# Patient Record
(demographics unavailable — no encounter records)

---

## 2024-10-30 NOTE — HISTORY OF PRESENT ILLNESS
[FreeTextEntry1] : Patient is a 52 yo F with DM2 dx 15, longstanding HTN since teenager, anemia, CKD with nephrotic range proteinura, who presents for followup of CKD.  Reviewed labs in detail. sCr stable but proteinuria and A1C a bit worse, has been having a lot of death in the family and depressed so not walking as much or eating as well.   Meds: Janumet, losartan, amlodpine, kerendia, Jardiance, iron  Taking iron with vitamin C every other day   PCP Dr. 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 Paternal aunt on dialysis, also with DM.  All other ROS negative

## 2024-10-30 NOTE — ASSESSMENT
[FreeTextEntry1] : Patient is a 52 yo F with DM2 dx 15, longstanding HTN since teenager, anemia, CKD with nephrotic range proteinura, who presents for followup of CKD.  CKD with proteinuria - likely 2/2 HTN and DM, ?autoimmune disease - Ophtho without diabetes per patient - 24 hour ABPM white coat HTN and well controlled HTN, no masked HTN  HTN - kerendia, losartan Discussed how to take BP appropriately at home. Advised not to smoke, drink caffeinated beverages or exercise within 30 minutes before measuring BP. Make sure cuff fits arm, as small cuffs can artificially raise BP. Make sure bladder is empty. With the cuff on your bare arm, sit in an upright position with back supported, feet flat on the floor and arm supported at heart level. Make sure the bottom of the cuff is directly above the bend of the elbow. Relax for about five minutes before taking a measurement. Resist the urge to talk or look at a cellphone. Wait one minute and retake BP, consider 3rd if elevated. Average the three readings and record in log book, bring to each check up. BP Cuff checked 4/5/2024, accurate  Family h/o kidney disease - [facility] genetic results previously reviewed with patient, 6 varients of undetermined significance found that can be associated with glomerulopathies however not usually associated with disease if heterozygous which she was for all. Advised to seek genetic counselling through [facility] if needed for more information. APOL1 gene testing NEGATIVE.  RTC in 4 months with labs prior.

## 2024-10-30 NOTE — HISTORY OF PRESENT ILLNESS
[FreeTextEntry1] : Patient is a 54 yo F with DM2 dx 15, longstanding HTN since teenager, anemia, CKD with nephrotic range proteinura, who presents for followup of CKD.  Reviewed labs in detail. sCr stable but proteinuria and A1C a bit worse, has been having a lot of death in the family and depressed so not walking as much or eating as well.   Meds: Janumet, losartan, amlodpine, kerendia, Jardiance, iron  Taking iron with vitamin C every other day   PCP Dr. 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 Paternal aunt on dialysis, also with DM.  All other ROS negative

## 2025-05-19 NOTE — HISTORY OF PRESENT ILLNESS
[FreeTextEntry1] : Patient is a 53 yo F with DM2 dx 15, longstanding HTN since teenager, anemia, CKD with nephrotic range proteinura, who presents for followup of CKD.  Reviewed labs in detail. sCr stable but proteinuria and A1C a bit worse again, up worse since october Will send note to edgardo Meds: Janumet, losartan, amlodpine, kerendia, Jardiance, iron  Taking iron with vitamin C every other day   PCP Dr. 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 Paternal aunt on dialysis, also with DM.  All other ROS negative

## 2025-05-19 NOTE — ASSESSMENT
[FreeTextEntry1] : Patient is a 55 yo F with DM2 dx 15, longstanding HTN since teenager, anemia, CKD with nephrotic range proteinura, who presents for followup of CKD.  CKD with proteinuria - likely 2/2 HTN and DM, ?autoimmune disease - Ophtho without diabetes per patient - 24 hour ABPM white coat HTN and well controlled HTN, no masked HTN - A1C and protienuria worsenign together, likely 2/2 DM, encouraged to reach out to pcp and endo for management  HTN - kerendia, losartan Discussed how to take BP appropriately at home. Advised not to smoke, drink caffeinated beverages or exercise within 30 minutes before measuring BP. Make sure cuff fits arm, as small cuffs can artificially raise BP. Make sure bladder is empty. With the cuff on your bare arm, sit in an upright position with back supported, feet flat on the floor and arm supported at heart level. Make sure the bottom of the cuff is directly above the bend of the elbow. Relax for about five minutes before taking a measurement. Resist the urge to talk or look at a cellphone. Wait one minute and retake BP, consider 3rd if elevated. Average the three readings and record in log book, bring to each check up. BP Cuff checked 4/5/2024, accurate  Family h/o kidney disease - [facility] genetic results previously reviewed with patient, 6 varients of undetermined significance found that can be associated with glomerulopathies however not usually associated with disease if heterozygous which she was for all. Advised to seek genetic counselling through [facility] if needed for more information. APOL1 gene testing NEGATIVE.  RTC in 4 months with labs prior.

## 2025-05-19 NOTE — PHYSICAL EXAM
[General Appearance - Alert] : alert [General Appearance - In No Acute Distress] : in no acute distress [Sclera] : the sclera and conjunctiva were normal [PERRL With Normal Accommodation] : pupils were equal in size, round, and reactive to light [Extraocular Movements] : extraocular movements were intact [Outer Ear] : the ears and nose were normal in appearance [Oropharynx] : the oropharynx was normal [Neck Appearance] : the appearance of the neck was normal [Neck Cervical Mass (___cm)] : no neck mass was observed [Jugular Venous Distention Increased] : there was no jugular-venous distention [Respiration, Rhythm And Depth] : normal respiratory rhythm and effort [Exaggerated Use Of Accessory Muscles For Inspiration] : no accessory muscle use [Auscultation Breath Sounds / Voice Sounds] : lungs were clear to auscultation bilaterally [Heart Rate And Rhythm] : heart rate was normal and rhythm regular [Heart Sounds] : normal S1 and S2 [Heart Sounds Gallop] : no gallops [Murmurs] : no murmurs [Heart Sounds Pericardial Friction Rub] : no pericardial rub [Edema] : there was no peripheral edema [Veins - Varicosity Changes] : there were no varicosital changes [Bowel Sounds] : normal bowel sounds [Abdomen Soft] : soft [Abdomen Tenderness] : non-tender [] : no hepato-splenomegaly [Abdomen Mass (___ Cm)] : no abdominal mass palpated [No CVA Tenderness] : no ~M costovertebral angle tenderness [No Spinal Tenderness] : no spinal tenderness [Abnormal Walk] : normal gait [Involuntary Movements] : no involuntary movements were seen [Cranial Nerves] : cranial nerves 2-12 were intact [No Focal Deficits] : no focal deficits [Affect] : the affect was normal [Mood] : the mood was normal

## 2025-05-19 NOTE — HISTORY OF PRESENT ILLNESS
[FreeTextEntry1] : Patient is a 55 yo F with DM2 dx 15, longstanding HTN since teenager, anemia, CKD with nephrotic range proteinura, who presents for followup of CKD.  Reviewed labs in detail. sCr stable but proteinuria and A1C a bit worse again, up worse since october Will send note to edgardo Meds: Janumet, losartan, amlodpine, kerendia, Jardiance, iron  Taking iron with vitamin C every other day   PCP Dr. 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 Paternal aunt on dialysis, also with DM.  All other ROS negative

## 2025-06-13 NOTE — PROCEDURE
[FreeTextEntry1] : unexplained worsening proteinuria Placed ABPM cuff on left upper arm. Gave instructions for device function and proper fit. approx sleep period: worked double last night - sleep about 11a - 5/6p, again 9p - 5a? current BP meds: amlodipine 5mg, irbesartan 150mg